# Patient Record
Sex: FEMALE | Race: WHITE | NOT HISPANIC OR LATINO | URBAN - METROPOLITAN AREA
[De-identification: names, ages, dates, MRNs, and addresses within clinical notes are randomized per-mention and may not be internally consistent; named-entity substitution may affect disease eponyms.]

---

## 2024-04-30 ENCOUNTER — OV NP (OUTPATIENT)
Dept: URBAN - METROPOLITAN AREA CLINIC 63 | Facility: CLINIC | Age: 59
End: 2024-04-30
Payer: MEDICARE

## 2024-04-30 VITALS
HEIGHT: 64 IN | SYSTOLIC BLOOD PRESSURE: 130 MMHG | WEIGHT: 165 LBS | HEART RATE: 77 BPM | BODY MASS INDEX: 28.17 KG/M2 | TEMPERATURE: 96.6 F | OXYGEN SATURATION: 96 % | DIASTOLIC BLOOD PRESSURE: 70 MMHG

## 2024-04-30 DIAGNOSIS — K44.9 HIATAL HERNIA: ICD-10-CM

## 2024-04-30 DIAGNOSIS — K21.9 GASTROESOPHAGEAL REFLUX DISEASE, UNSPECIFIED WHETHER ESOPHAGITIS PRESENT: ICD-10-CM

## 2024-04-30 DIAGNOSIS — R14.2 BELCHING: ICD-10-CM

## 2024-04-30 DIAGNOSIS — K22.70 BARRETT'S ESOPHAGUS WITHOUT DYSPLASIA: ICD-10-CM

## 2024-04-30 PROBLEM — 302914006: Status: ACTIVE | Noted: 2024-04-30

## 2024-04-30 PROBLEM — 235595009: Status: ACTIVE | Noted: 2024-04-30

## 2024-04-30 PROCEDURE — 99204 OFFICE O/P NEW MOD 45 MIN: CPT

## 2024-04-30 RX ORDER — SEMAGLUTIDE 0.68 MG/ML
INJECT 0.5MG UNDER THE SKIN EVERY WEEK INJECTION, SOLUTION SUBCUTANEOUS
Qty: 3 UNSPECIFIED | Refills: 2 | Status: ACTIVE | COMMUNITY

## 2024-04-30 RX ORDER — RANOLAZINE 1000 MG/1
TAKE ONE TABLET BY MOUTH TWICE A DAY AS DIRECTED TABLET, FILM COATED, EXTENDED RELEASE ORAL
Qty: 180 UNSPECIFIED | Refills: 0 | Status: ACTIVE | COMMUNITY

## 2024-04-30 RX ORDER — PANTOPRAZOLE SODIUM 40 MG/1
TAKE ONE TABLET BY MOUTH ONE TIME DAILY TABLET, DELAYED RELEASE ORAL
Qty: 90 UNSPECIFIED | Refills: 2 | Status: ACTIVE | COMMUNITY

## 2024-04-30 RX ORDER — NITROGLYCERIN 0.4 MG/1
PLACE ONE TABLET UNDER THE TONGUE AT ONSET OF CHEST PAIN. MAY REPEAT EVERY 5 MINUTES AS NEEDED FOR CHEST PAIN UP TO 3 TABLETS IN 15 MINUTES TABLET SUBLINGUAL
Qty: 25 UNSPECIFIED | Refills: 0 | Status: ACTIVE | COMMUNITY

## 2024-04-30 RX ORDER — PRASUGREL 10 MG/1
TABLET, FILM COATED ORAL
Qty: 90 TABLET | Status: ACTIVE | COMMUNITY

## 2024-04-30 RX ORDER — EVOLOCUMAB 140 MG/ML
INJECT 140 MG UNDER THE SKIN EVERY TWO WEEKS INTO THE THIGH, STOMACH, OR UPPER ARM INJECTION, SOLUTION SUBCUTANEOUS
Qty: 2 UNSPECIFIED | Refills: 0 | Status: ACTIVE | COMMUNITY

## 2024-04-30 RX ORDER — ASPIRIN 81 MG/1
1 TABLET TABLET, COATED ORAL ONCE A DAY
Status: ACTIVE | COMMUNITY

## 2024-04-30 RX ORDER — METOPROLOL TARTRATE 25 MG/1
TAKE ONE TABLET BY MOUTH TWICE A DAY WITH FOOD TABLET, FILM COATED ORAL
Qty: 60 UNSPECIFIED | Refills: 3 | Status: ACTIVE | COMMUNITY

## 2024-04-30 RX ORDER — FAMOTIDINE 40 MG/1
TAKE ONE TABLET BY MOUTH ONE TIME DAILY TABLET, FILM COATED ORAL
Qty: 30 UNSPECIFIED | Refills: 0 | Status: ACTIVE | COMMUNITY

## 2024-04-30 RX ORDER — TOPIRAMATE 100 MG/1
TAKE ONE TABLET BY MOUTH THREE TIMES A DAY TABLET, FILM COATED ORAL
Qty: 270 UNSPECIFIED | Refills: 1 | Status: ACTIVE | COMMUNITY

## 2024-04-30 RX ORDER — TRAMADOL HYDROCHLORIDE 50 MG/1
TAKE ONE TABLET BY MOUTH EVERY 6 HOURS AS NEEDED FOR PAIN TABLET ORAL
Qty: 120 UNSPECIFIED | Refills: 0 | Status: ACTIVE | COMMUNITY

## 2024-04-30 RX ORDER — ISOSORBIDE MONONITRATE 30 MG/1
TAKE ONE TABLET BY MOUTH ONE TIME DAILY IN THE MORNING TABLET, EXTENDED RELEASE ORAL
Qty: 30 UNSPECIFIED | Refills: 2 | Status: ACTIVE | COMMUNITY

## 2024-04-30 RX ORDER — EZETIMIBE 10 MG/1
TAKE ONE TABLET BY MOUTH ONE TIME DAILY TABLET ORAL
Qty: 30 UNSPECIFIED | Refills: 3 | Status: ACTIVE | COMMUNITY

## 2024-04-30 RX ORDER — HYDROCODONE BITARTRATE AND ACETAMINOPHEN 7.5; 325 MG/1; MG/1
TAKE ONE TABLET BY MOUTH ONE TIME DAILY AS NEEDED FOR PAIN TABLET ORAL
Qty: 30 UNSPECIFIED | Refills: 0 | Status: ACTIVE | COMMUNITY

## 2024-04-30 NOTE — HPI-TODAY'S VISIT:
Lia is a 59-year-old female presenting to the office today for evaluation of heartburn, hiatal hernia. She has a long-term history of GERD and states that for the past couple of months she has been experiencing increased reflux along with "extremely deep and loud burps that her", she also adds in that they "smell bad". 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 Otherwise, she has no GI complaints, questions, concerns at this time. She denies melena, hematochezia, bright red blood per rectum, hematemesis, abdominal pain, change in bowel habits, change in stool caliber, unintentional weight loss/weight gain. She does have a history of heart attacks in 2008, 2009. She has also had multiple stents placed but has not had a stent placed since 2015. She is on blood thinners as well. She has a history of diabetes 2 and is on a GLP-1. She also has a history of TIA. She denies a history of stroke, pacemaker/defibrillator, COPD, asthma, sleep apnea, chronic kidney disease, seizures. . Colonoscopy 9/22/2021 completed by Dr. Snow; - Small internal hemorrhoids - Repeat colonoscopy for colorectal cancer screening in 10 years . EGD 9/22/2021 completed by Dr. Snow; - Proximal esophagus appeared normal - C5-M10 Wellington's esophagus, several biopsies of mucosa suspicious for Wellington's esophagus obtained with cold biopsy forceps - 2 cm hiatal hernia - Mild nonerosive antral gastritis manifested by streaky erythema - Antral biopsy obtained with cold biopsy forceps - Otherwise normal stomach - Normal-appearing duodenal bulb, sweep and second portion of the duodenum - Pathology; - Stomach antrum biopsy; gastric mucosa with reactive gastropathy. Negative for H. pylori. - Distal esophagus biopsy; gastroesophageal junction mucosa with mild chronic inflammation and intestinal metaplasia consistent with Wellington's esophagus. Negative for dysplasia or malignancy.

## 2024-05-07 ENCOUNTER — LAB OUTSIDE AN ENCOUNTER (OUTPATIENT)
Dept: URBAN - METROPOLITAN AREA CLINIC 63 | Facility: CLINIC | Age: 59
End: 2024-05-07

## 2024-05-08 ENCOUNTER — TELEPHONE ENCOUNTER (OUTPATIENT)
Dept: URBAN - METROPOLITAN AREA CLINIC 63 | Facility: CLINIC | Age: 59
End: 2024-05-08

## 2024-06-12 ENCOUNTER — TELEPHONE ENCOUNTER (OUTPATIENT)
Dept: URBAN - METROPOLITAN AREA CLINIC 63 | Facility: CLINIC | Age: 59
End: 2024-06-12

## 2024-07-02 ENCOUNTER — OFFICE VISIT (OUTPATIENT)
Dept: URBAN - METROPOLITAN AREA SURGERY CENTER 4 | Facility: SURGERY CENTER | Age: 59
End: 2024-07-02

## 2024-07-02 RX ORDER — TOPIRAMATE 100 MG/1
TAKE ONE TABLET BY MOUTH THREE TIMES A DAY TABLET, FILM COATED ORAL
Qty: 270 UNSPECIFIED | Refills: 1 | Status: ACTIVE | COMMUNITY

## 2024-07-02 RX ORDER — TRAMADOL HYDROCHLORIDE 50 MG/1
TAKE ONE TABLET BY MOUTH EVERY 6 HOURS AS NEEDED FOR PAIN TABLET ORAL
Qty: 120 UNSPECIFIED | Refills: 0 | Status: ACTIVE | COMMUNITY

## 2024-07-02 RX ORDER — ISOSORBIDE MONONITRATE 30 MG/1
TAKE ONE TABLET BY MOUTH ONE TIME DAILY IN THE MORNING TABLET, EXTENDED RELEASE ORAL
Qty: 30 UNSPECIFIED | Refills: 2 | Status: ACTIVE | COMMUNITY

## 2024-07-02 RX ORDER — HYDROCODONE BITARTRATE AND ACETAMINOPHEN 7.5; 325 MG/1; MG/1
TAKE ONE TABLET BY MOUTH ONE TIME DAILY AS NEEDED FOR PAIN TABLET ORAL
Qty: 30 UNSPECIFIED | Refills: 0 | Status: ACTIVE | COMMUNITY

## 2024-07-02 RX ORDER — METOPROLOL TARTRATE 25 MG/1
TAKE ONE TABLET BY MOUTH TWICE A DAY WITH FOOD TABLET, FILM COATED ORAL
Qty: 60 UNSPECIFIED | Refills: 3 | Status: ACTIVE | COMMUNITY

## 2024-07-02 RX ORDER — ASPIRIN 81 MG/1
1 TABLET TABLET, COATED ORAL ONCE A DAY
Status: ACTIVE | COMMUNITY

## 2024-07-02 RX ORDER — NITROGLYCERIN 0.4 MG/1
PLACE ONE TABLET UNDER THE TONGUE AT ONSET OF CHEST PAIN. MAY REPEAT EVERY 5 MINUTES AS NEEDED FOR CHEST PAIN UP TO 3 TABLETS IN 15 MINUTES TABLET SUBLINGUAL
Qty: 25 UNSPECIFIED | Refills: 0 | Status: ACTIVE | COMMUNITY

## 2024-07-02 RX ORDER — PRASUGREL 10 MG/1
TABLET, FILM COATED ORAL
Qty: 90 TABLET | Status: ACTIVE | COMMUNITY

## 2024-07-02 RX ORDER — EZETIMIBE 10 MG/1
TAKE ONE TABLET BY MOUTH ONE TIME DAILY TABLET ORAL
Qty: 30 UNSPECIFIED | Refills: 3 | Status: ACTIVE | COMMUNITY

## 2024-07-02 RX ORDER — FAMOTIDINE 40 MG/1
TAKE ONE TABLET BY MOUTH ONE TIME DAILY TABLET, FILM COATED ORAL
Qty: 30 UNSPECIFIED | Refills: 0 | Status: ACTIVE | COMMUNITY

## 2024-07-02 RX ORDER — RANOLAZINE 1000 MG/1
TAKE ONE TABLET BY MOUTH TWICE A DAY AS DIRECTED TABLET, FILM COATED, EXTENDED RELEASE ORAL
Qty: 180 UNSPECIFIED | Refills: 0 | Status: ACTIVE | COMMUNITY

## 2024-07-02 RX ORDER — SEMAGLUTIDE 0.68 MG/ML
INJECT 0.5MG UNDER THE SKIN EVERY WEEK INJECTION, SOLUTION SUBCUTANEOUS
Qty: 3 UNSPECIFIED | Refills: 2 | Status: ACTIVE | COMMUNITY

## 2024-07-02 RX ORDER — EVOLOCUMAB 140 MG/ML
INJECT 140 MG UNDER THE SKIN EVERY TWO WEEKS INTO THE THIGH, STOMACH, OR UPPER ARM INJECTION, SOLUTION SUBCUTANEOUS
Qty: 2 UNSPECIFIED | Refills: 0 | Status: ACTIVE | COMMUNITY

## 2024-07-02 RX ORDER — PANTOPRAZOLE SODIUM 40 MG/1
TAKE ONE TABLET BY MOUTH ONE TIME DAILY TABLET, DELAYED RELEASE ORAL
Qty: 90 UNSPECIFIED | Refills: 2 | Status: ACTIVE | COMMUNITY

## 2024-08-09 ENCOUNTER — TELEPHONE ENCOUNTER (OUTPATIENT)
Dept: URBAN - METROPOLITAN AREA SURGERY CENTER 4 | Facility: SURGERY CENTER | Age: 59
End: 2024-08-09

## 2024-08-19 ENCOUNTER — CLAIMS CREATED FROM THE CLAIM WINDOW (OUTPATIENT)
Dept: URBAN - METROPOLITAN AREA SURGERY CENTER 4 | Facility: SURGERY CENTER | Age: 59
End: 2024-08-19
Payer: MEDICARE

## 2024-08-19 ENCOUNTER — CLAIMS CREATED FROM THE CLAIM WINDOW (OUTPATIENT)
Dept: URBAN - METROPOLITAN AREA CLINIC 4 | Facility: CLINIC | Age: 59
End: 2024-08-19
Payer: MEDICARE

## 2024-08-19 DIAGNOSIS — K44.9 DIAPHRAGMATIC HERNIA WITHOUT OBSTRUCTION OR GANGRENE: ICD-10-CM

## 2024-08-19 DIAGNOSIS — K44.9 HIATAL HERNIA: ICD-10-CM

## 2024-08-19 DIAGNOSIS — K21.9 GASTRO-ESOPHAGEAL REFLUX DISEASE WITHOUT ESOPHAGITIS: ICD-10-CM

## 2024-08-19 DIAGNOSIS — K22.89 OTHER SPECIFIED DISEASE OF ESOPHAGUS: ICD-10-CM

## 2024-08-19 DIAGNOSIS — K22.719 BARRETT'S ESOPHAGUS WITH DYSPLASIA, UNSPECIFIED: ICD-10-CM

## 2024-08-19 PROCEDURE — 88305 TISSUE EXAM BY PATHOLOGIST: CPT | Performed by: PATHOLOGY

## 2024-08-19 PROCEDURE — 00731 ANES UPR GI NDSC PX NOS: CPT | Performed by: NURSE ANESTHETIST, CERTIFIED REGISTERED

## 2024-08-19 PROCEDURE — 43239 EGD BIOPSY SINGLE/MULTIPLE: CPT | Performed by: INTERNAL MEDICINE

## 2024-08-19 PROCEDURE — 43239 EGD BIOPSY SINGLE/MULTIPLE: CPT | Performed by: CLINIC/CENTER

## 2024-08-19 RX ORDER — TOPIRAMATE 100 MG/1
TAKE ONE TABLET BY MOUTH THREE TIMES A DAY TABLET, FILM COATED ORAL
Qty: 270 UNSPECIFIED | Refills: 1 | Status: ACTIVE | COMMUNITY

## 2024-08-19 RX ORDER — EZETIMIBE 10 MG/1
TAKE ONE TABLET BY MOUTH ONE TIME DAILY TABLET ORAL
Qty: 30 UNSPECIFIED | Refills: 3 | Status: ACTIVE | COMMUNITY

## 2024-08-19 RX ORDER — SEMAGLUTIDE 0.68 MG/ML
INJECT 0.5MG UNDER THE SKIN EVERY WEEK INJECTION, SOLUTION SUBCUTANEOUS
Qty: 3 UNSPECIFIED | Refills: 2 | Status: ACTIVE | COMMUNITY

## 2024-08-19 RX ORDER — ASPIRIN 81 MG/1
1 TABLET TABLET, COATED ORAL ONCE A DAY
Status: ACTIVE | COMMUNITY

## 2024-08-19 RX ORDER — NITROGLYCERIN 0.4 MG/1
PLACE ONE TABLET UNDER THE TONGUE AT ONSET OF CHEST PAIN. MAY REPEAT EVERY 5 MINUTES AS NEEDED FOR CHEST PAIN UP TO 3 TABLETS IN 15 MINUTES TABLET SUBLINGUAL
Qty: 25 UNSPECIFIED | Refills: 0 | Status: ACTIVE | COMMUNITY

## 2024-08-19 RX ORDER — PANTOPRAZOLE SODIUM 40 MG/1
TAKE ONE TABLET BY MOUTH ONE TIME DAILY TABLET, DELAYED RELEASE ORAL
Qty: 90 UNSPECIFIED | Refills: 2 | Status: ACTIVE | COMMUNITY

## 2024-08-19 RX ORDER — PRASUGREL 10 MG/1
TABLET, FILM COATED ORAL
Qty: 90 TABLET | Status: ACTIVE | COMMUNITY

## 2024-08-19 RX ORDER — HYDROCODONE BITARTRATE AND ACETAMINOPHEN 7.5; 325 MG/1; MG/1
TAKE ONE TABLET BY MOUTH ONE TIME DAILY AS NEEDED FOR PAIN TABLET ORAL
Qty: 30 UNSPECIFIED | Refills: 0 | Status: ACTIVE | COMMUNITY

## 2024-08-19 RX ORDER — METOPROLOL TARTRATE 25 MG/1
TAKE ONE TABLET BY MOUTH TWICE A DAY WITH FOOD TABLET, FILM COATED ORAL
Qty: 60 UNSPECIFIED | Refills: 3 | Status: ACTIVE | COMMUNITY

## 2024-08-19 RX ORDER — EVOLOCUMAB 140 MG/ML
INJECT 140 MG UNDER THE SKIN EVERY TWO WEEKS INTO THE THIGH, STOMACH, OR UPPER ARM INJECTION, SOLUTION SUBCUTANEOUS
Qty: 2 UNSPECIFIED | Refills: 0 | Status: ACTIVE | COMMUNITY

## 2024-08-19 RX ORDER — FAMOTIDINE 40 MG/1
TAKE ONE TABLET BY MOUTH ONE TIME DAILY TABLET, FILM COATED ORAL
Qty: 30 UNSPECIFIED | Refills: 0 | Status: ACTIVE | COMMUNITY

## 2024-08-19 RX ORDER — RANOLAZINE 1000 MG/1
TAKE ONE TABLET BY MOUTH TWICE A DAY AS DIRECTED TABLET, FILM COATED, EXTENDED RELEASE ORAL
Qty: 180 UNSPECIFIED | Refills: 0 | Status: ACTIVE | COMMUNITY

## 2024-08-19 RX ORDER — TRAMADOL HYDROCHLORIDE 50 MG/1
TAKE ONE TABLET BY MOUTH EVERY 6 HOURS AS NEEDED FOR PAIN TABLET ORAL
Qty: 120 UNSPECIFIED | Refills: 0 | Status: ACTIVE | COMMUNITY

## 2024-08-19 RX ORDER — ISOSORBIDE MONONITRATE 30 MG/1
TAKE ONE TABLET BY MOUTH ONE TIME DAILY IN THE MORNING TABLET, EXTENDED RELEASE ORAL
Qty: 30 UNSPECIFIED | Refills: 2 | Status: ACTIVE | COMMUNITY

## 2024-12-19 ENCOUNTER — OFFICE VISIT (OUTPATIENT)
Dept: URBAN - METROPOLITAN AREA CLINIC 63 | Facility: CLINIC | Age: 59
End: 2024-12-19
Payer: MEDICARE

## 2024-12-19 ENCOUNTER — DASHBOARD ENCOUNTERS (OUTPATIENT)
Age: 59
End: 2024-12-19

## 2024-12-19 VITALS
TEMPERATURE: 97.9 F | OXYGEN SATURATION: 98 % | BODY MASS INDEX: 30.22 KG/M2 | SYSTOLIC BLOOD PRESSURE: 130 MMHG | DIASTOLIC BLOOD PRESSURE: 90 MMHG | HEIGHT: 64 IN | HEART RATE: 71 BPM | WEIGHT: 177 LBS

## 2024-12-19 DIAGNOSIS — K59.09 CHRONIC CONSTIPATION: ICD-10-CM

## 2024-12-19 DIAGNOSIS — K44.9 HIATAL HERNIA: ICD-10-CM

## 2024-12-19 DIAGNOSIS — K22.70 BARRETT'S ESOPHAGUS WITHOUT DYSPLASIA: ICD-10-CM

## 2024-12-19 DIAGNOSIS — K21.9 ACID REFLUX: ICD-10-CM

## 2024-12-19 DIAGNOSIS — R14.2 BELCHING: ICD-10-CM

## 2024-12-19 PROCEDURE — 99214 OFFICE O/P EST MOD 30 MIN: CPT

## 2024-12-19 RX ORDER — NITROGLYCERIN 0.4 MG/1
PLACE ONE TABLET UNDER THE TONGUE AT ONSET OF CHEST PAIN. MAY REPEAT EVERY 5 MINUTES AS NEEDED FOR CHEST PAIN UP TO 3 TABLETS IN 15 MINUTES TABLET SUBLINGUAL
Qty: 25 UNSPECIFIED | Refills: 0 | Status: ACTIVE | COMMUNITY

## 2024-12-19 RX ORDER — HYDROCODONE BITARTRATE AND ACETAMINOPHEN 7.5; 325 MG/1; MG/1
TAKE ONE TABLET BY MOUTH ONE TIME DAILY AS NEEDED FOR PAIN TABLET ORAL
Qty: 30 UNSPECIFIED | Refills: 0 | Status: ACTIVE | COMMUNITY

## 2024-12-19 RX ORDER — TOPIRAMATE 100 MG/1
TAKE ONE TABLET BY MOUTH THREE TIMES A DAY TABLET, FILM COATED ORAL
Qty: 270 UNSPECIFIED | Refills: 1 | Status: ACTIVE | COMMUNITY

## 2024-12-19 RX ORDER — RANOLAZINE 1000 MG/1
TAKE ONE TABLET BY MOUTH TWICE A DAY AS DIRECTED TABLET, FILM COATED, EXTENDED RELEASE ORAL
Qty: 180 UNSPECIFIED | Refills: 0 | Status: ACTIVE | COMMUNITY

## 2024-12-19 RX ORDER — TIRZEPATIDE 2.5 MG/.5ML
AS DIRECTED INJECTION, SOLUTION SUBCUTANEOUS
Status: ACTIVE | COMMUNITY

## 2024-12-19 RX ORDER — FAMOTIDINE 40 MG/1
TAKE ONE TABLET BY MOUTH ONE TIME DAILY TABLET, FILM COATED ORAL
Qty: 30 UNSPECIFIED | Refills: 0 | Status: ACTIVE | COMMUNITY

## 2024-12-19 RX ORDER — ISOSORBIDE MONONITRATE 30 MG/1
TAKE ONE TABLET BY MOUTH ONE TIME DAILY IN THE MORNING TABLET, EXTENDED RELEASE ORAL
Qty: 30 UNSPECIFIED | Refills: 2 | Status: ACTIVE | COMMUNITY

## 2024-12-19 RX ORDER — EVOLOCUMAB 140 MG/ML
INJECT 140 MG UNDER THE SKIN EVERY TWO WEEKS INTO THE THIGH, STOMACH, OR UPPER ARM INJECTION, SOLUTION SUBCUTANEOUS
Qty: 2 UNSPECIFIED | Refills: 0 | Status: ACTIVE | COMMUNITY

## 2024-12-19 RX ORDER — PRASUGREL 10 MG/1
TABLET, FILM COATED ORAL
Qty: 90 TABLET | Status: ACTIVE | COMMUNITY

## 2024-12-19 RX ORDER — PANTOPRAZOLE SODIUM 40 MG/1
TAKE ONE TABLET BY MOUTH ONE TIME DAILY TABLET, DELAYED RELEASE ORAL
Qty: 90 UNSPECIFIED | Refills: 2 | Status: ACTIVE | COMMUNITY

## 2024-12-19 RX ORDER — ASPIRIN 81 MG/1
1 TABLET TABLET, COATED ORAL ONCE A DAY
Status: ACTIVE | COMMUNITY

## 2024-12-19 RX ORDER — EZETIMIBE 10 MG/1
TAKE ONE TABLET BY MOUTH ONE TIME DAILY TABLET ORAL
Qty: 30 UNSPECIFIED | Refills: 3 | Status: ACTIVE | COMMUNITY

## 2024-12-19 RX ORDER — TRAMADOL HYDROCHLORIDE 50 MG/1
TAKE ONE TABLET BY MOUTH EVERY 6 HOURS AS NEEDED FOR PAIN TABLET ORAL
Qty: 120 UNSPECIFIED | Refills: 0 | Status: ACTIVE | COMMUNITY

## 2024-12-19 NOTE — HPI-TODAY'S VISIT:
Patient is a very pleasant 59-year-old female who presents for follow-up. She is a patient of Dr. Cook. Last seen by Arnulfo Cerrato PA-C on 4/30/2024. Past medical history significant for hyperlipidemia, Wellington's esophagus without dysplasia, GERD, fibroid tumor, CAD, PVD, sleep apnea, MI, hypertension, diabetes. Past surgical history reviewed. Last endoscopy 8/19/2024. Family history noncontributory. Last colonoscopy 9/22/2021. Recall for 10 years.   Patient presents for follow up. She tolerated EGD with no complaints. She continues to get sulfur belching despite PPI use. She admits to have bowel movements about 2 times a week. She does not have a bowel regimen. She denies dysphagia, dyspepsia, abdominal pain, change in bowel habits, unintentional weight loss, melena, or hematochezia.

## 2024-12-19 NOTE — HPI-PREVIOUS PROCEDURES
8/19/2024 endoscopy consistent with long segment Wellington's esophagus classified as C4 and 6 1 cm hiatal hernia Normal stomach and duodenum Biopsies confirming intestinal metaplasia in the esophagus negative for EOE, dysplasia or malignancy at all biopsies Recall for 3 years  Colonoscopy 9/22/2021 completed by Dr. Snow; - Small internal hemorrhoids - Repeat colonoscopy for colorectal cancer screening in 10 years  EGD 9/22/2021 completed by Dr. Snow; - Proximal esophagus appeared normal - C5-M10 Wellington's esophagus, several biopsies of mucosa suspicious for Wellington's esophagus obtained with cold biopsy forceps - 2 cm hiatal hernia - Mild nonerosive antral gastritis manifested by streaky erythema - Antral biopsy obtained with cold biopsy forceps - Otherwise normal stomach - Normal-appearing duodenal bulb, sweep and second portion of the duodenum - Pathology; - Stomach antrum biopsy; gastric mucosa with reactive gastropathy. Negative for H. pylori. - Distal esophagus biopsy; gastroesophageal junction mucosa with mild chronic inflammation and intestinal metaplasia consistent with Wellington's esophagus. Negative for dysplasia or malignancy.

## 2025-03-18 ENCOUNTER — OFFICE VISIT (OUTPATIENT)
Dept: URBAN - METROPOLITAN AREA CLINIC 63 | Facility: CLINIC | Age: 60
End: 2025-03-18